# Patient Record
Sex: FEMALE | Race: WHITE
[De-identification: names, ages, dates, MRNs, and addresses within clinical notes are randomized per-mention and may not be internally consistent; named-entity substitution may affect disease eponyms.]

---

## 2020-07-02 ENCOUNTER — HOSPITAL ENCOUNTER (INPATIENT)
Dept: HOSPITAL 84 - D.LDO | Age: 25
LOS: 2 days | Discharge: HOME | End: 2020-07-04
Attending: OBSTETRICS & GYNECOLOGY | Admitting: OBSTETRICS & GYNECOLOGY
Payer: MEDICAID

## 2020-07-02 VITALS — SYSTOLIC BLOOD PRESSURE: 115 MMHG | DIASTOLIC BLOOD PRESSURE: 76 MMHG

## 2020-07-02 VITALS — WEIGHT: 160 LBS | HEIGHT: 62 IN | BODY MASS INDEX: 29.44 KG/M2 | BODY MASS INDEX: 29.44 KG/M2

## 2020-07-02 DIAGNOSIS — Z3A.39: ICD-10-CM

## 2020-07-02 LAB
AMPHETAMINES UR QL SCN: NEGATIVE QUAL
BARBITURATES UR QL SCN: NEGATIVE QUAL
BENZODIAZ UR QL SCN: NEGATIVE QUAL
BZE UR QL SCN: NEGATIVE QUAL
CANNABINOIDS UR QL SCN: NEGATIVE QUAL
ERYTHROCYTE [DISTWIDTH] IN BLOOD BY AUTOMATED COUNT: 16.2 % (ref 11.5–14.5)
HCT VFR BLD CALC: 45.6 % (ref 36–48)
HGB BLD-MCNC: 14.7 G/DL (ref 12–16)
MCH RBC QN AUTO: 35.3 PG (ref 26–34)
MCHC RBC AUTO-ENTMCNC: 32.2 G/DL (ref 31–37)
MCV RBC: 109.4 FL (ref 80–100)
OPIATES UR QL SCN: NEGATIVE QUAL
PCP UR QL SCN: NEGATIVE QUAL
PLATELET # BLD: 262 10X3/UL (ref 130–400)
PMV BLD AUTO: 9.7 FL (ref 7.4–10.4)
RBC # BLD AUTO: 4.17 10X6/UL (ref 4–5.4)
WBC # BLD AUTO: 14.7 10X3/UL (ref 4.8–10.8)

## 2020-07-02 NOTE — NUR
LARGE CUP OF ICE PER PT REQUEST. SHE IS EATING AT THIS TIME, ASK THAT SHE
PLEASE CALL WHEN FINISHED SO THAT ADMIT HX CAN BE COMPLETED STATES HER
UNDERSTANDING.  INFANT IN CRIB AT BEDSIDE WITH SIG OTHER ALSO PRESENT.

## 2020-07-02 NOTE — NUR
PT ASSESSMENT COMPLETED.  FUNDUS IS FIRM, BLEEDING IS SMALL TO MED.  SKIN
WARM AND DRY. SHE HAS NO C/0 PAIN. NO V/V
SHE IS UP AND ABOUT IN HER ROOM. PT SO IS
PRESENT.  WHEN HE IS PRESENT HE ANSWERS QUESTIONS FOR PT.
PT IS ON A REGULAR DIET.  HEART SOUNDS WNL, LUNGS CLEAR, BS HEARD.
PT IS SITTING UP IN BED.  BABY IS IN THE CRIB.  CALL LIGHT IS BESIDE PT IN
BED. INSTRUCTED TO CALL IF SHE NEEDS ANYTHING.

## 2020-07-02 NOTE — NUR
PT RECEIVED BY WHEELCHAIR FROM LABOR AND DELIVERY, SHE IS AWAKE AND ALERT,
DENIES PAIN OR DISCOMFORT AT THIS TIME. FUNDUS FIRM AT U/1 LIGHT BLEEDING
WITHOUT CLOTS NOTED. MICHAEL PADS AND MESH BRIEFS PLACED IN BATHROOM.  SIG OTHER
AT BEDSIDE.

## 2020-07-02 NOTE — NUR
PT CONTINUE TO DENY PAIN, ATTEMPTING TO BREASTFEED BUT CALLS OUT REQUESTING A
BOTTLE. NO OTHER NEEDS AT THIS TIME.

## 2020-07-03 VITALS — DIASTOLIC BLOOD PRESSURE: 62 MMHG | SYSTOLIC BLOOD PRESSURE: 106 MMHG

## 2020-07-03 VITALS — SYSTOLIC BLOOD PRESSURE: 107 MMHG | DIASTOLIC BLOOD PRESSURE: 54 MMHG

## 2020-07-03 VITALS — DIASTOLIC BLOOD PRESSURE: 64 MMHG | SYSTOLIC BLOOD PRESSURE: 107 MMHG

## 2020-07-03 VITALS — DIASTOLIC BLOOD PRESSURE: 57 MMHG | SYSTOLIC BLOOD PRESSURE: 104 MMHG

## 2020-07-03 LAB
BASOPHILS NFR BLD AUTO: 0.3 % (ref 0–2)
EOSINOPHIL NFR BLD: 3.3 % (ref 0–7)
ERYTHROCYTE [DISTWIDTH] IN BLOOD BY AUTOMATED COUNT: 15.6 % (ref 11.5–14.5)
HCT VFR BLD CALC: 31.3 % (ref 36–48)
HCV AB S/CO SERPL IA: >11 S/CO RAT (ref 0–0.9)
HGB BLD-MCNC: 9.9 G/DL (ref 12–16)
IMM GRANULOCYTES NFR BLD: 3.2 % (ref 0–5)
LYMPHOCYTES NFR BLD AUTO: 21.8 % (ref 15–50)
MCH RBC QN AUTO: 27.7 PG (ref 26–34)
MCHC RBC AUTO-ENTMCNC: 31.6 G/DL (ref 31–37)
MCV RBC: 87.4 FL (ref 80–100)
MONOCYTES NFR BLD: 7.8 % (ref 2–11)
NEUTROPHILS NFR BLD AUTO: 63.6 % (ref 40–80)
PLATELET # BLD: 331 10X3/UL (ref 130–400)
PMV BLD AUTO: 9.2 FL (ref 7.4–10.4)
RBC # BLD AUTO: 3.58 10X6/UL (ref 4–5.4)
RUBV IGG SERPL IA-ACNC: 1.82 INDEX
WBC # BLD AUTO: 16.9 10X3/UL (ref 4.8–10.8)

## 2020-07-03 NOTE — NUR
ASSESSMENT COMPLETED.  SKIN WARM AND DRY. FUNDUS FIRM.  PT STATES BLEEDING IS
SMALL. HEART SOUNDS WNO, LUNGS CLEAR AND BS HEARD IN ALL QUADS.  SHE IS ON A
REGULAR DIET.  VOIDING WELL WITHOUT PROBLEMS. SHE IS UP WALKING AROUND.  PT SO
ASKED IF THEY COULD TAKE THE BABY TO THE NURSERY AND GO OUT AND WALK IN THE
FRESH AIR.  I STATED THAT IF SHE IS GOING OUTSIDE TO SMOKE THAT SHE HAD TO
TAKE OFF HER NICOTINE PATCH. ABOUT THAT TIME THERE WAS A BIG THUNDER CLAP AND
IT STARTED RAINING.  THEY HAVE NOT ASKED AGAIN. FRESH WATER WAS DELIVERED TO
HER ROOM.  CALL LIGHT IS WITHIN REACH.
PT SEEMS TO BE BONDING WITH THE BABY MORE.  SHE HAS BABY IN BED WITH HER
PLAYING.  I'VE SEEN NO BONDING WITH THE BABY'S FATHER.
IN THE BED WITH

## 2020-07-03 NOTE — NUR
ASSESSMENT COMPLETED. VSS. DENIES PAIN OR NEEDS. BABY IN BED WITH MOM ABOUT TO
FEED. MOM STATED SHE DOESNT HAVE BOTTLE NIPPLES. NIPPLES GIVEN.

## 2020-07-03 NOTE — NUR
PT IS AWAKE AND ALERT.  SHE REQUESTED A MOTRIN. THIS WAS GIVEN TO HER. SHE
RATES HER PAIN AS 3.  NO OTHER NEEDS AT THIS TIME

## 2020-07-03 NOTE — NUR
SPOKE WITH DR BAER ABOUT BABY'S D/C. BABY WILL NOT GO UNTIL MONDAY BECAUSE OF
RPR RESULT AND [facility] BEING NOTIFIED OF NO PRENATAL CARE.

## 2020-07-04 VITALS — DIASTOLIC BLOOD PRESSURE: 64 MMHG | SYSTOLIC BLOOD PRESSURE: 105 MMHG

## 2020-07-04 NOTE — NUR
AWAKENED FOR ASSESSMENT. VERBAL RESPONSES APPRO TO QUESTIONS. DENIES PAIN
DENIES NEEDS. FUNDUS U2/FIRM. SCANT LOCHIA NOTED ON PAD. REG BREAKFAST SERVED.

## 2020-07-04 NOTE — NUR
PT STATES SHE IS READY FOR ROOMING IN STATUS. DISCHARGE INST VERBAL AND
WRITTEN GIVEN. NO SCRIPTS TO BE GIVEN. AWHONN POST BIRTH WARNING SIGNS GIVEN.
SEE ALSO SIGNED DISCHARGE INFO SHEETS. PT DENIES ANY QUESTIONS. DISCHARGE
COMPLETE- WILL REMAIN IN SAME ROOM FOR ROOMING IN STATUS AT THIS TIME.